# Patient Record
Sex: MALE | Race: WHITE | Employment: UNEMPLOYED | URBAN - METROPOLITAN AREA
[De-identification: names, ages, dates, MRNs, and addresses within clinical notes are randomized per-mention and may not be internally consistent; named-entity substitution may affect disease eponyms.]

---
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
## 2023-05-30 NOTE — ED PROVIDER NOTES
Patient is seen today for an In Person/In Office Visit    I have reviewed Active Medication List, Allergies, Vital Signs and Active Problem List.    Chief Complaint   Patient presents with   • Follow-up     6 month check     HTN    Subjective:    Here for Follow-up of Hypertension    He has underlying hypertension under borderline control although on my recheck blood pressure was a bit better.      He has dyslipidemia and has a history of plaquing to the carotid artery but no significant stenosis.  Not currently on medication for his cholesterol.      He has prediabetes which is been stable.    He voices no other additional concerns at this time.     Current Outpatient Medications   Medication Sig Dispense Refill   • amLODIPine (NORVASC) 10 MG tablet Take 1 tablet by mouth daily. 90 tablet 3   • carvedilol (COREG) 12.5 MG tablet Take 1 tablet by mouth in the morning and 1 tablet in the evening. Take with meals. 180 tablet 3   • atorvastatin (LIPITOR) 10 MG tablet Take 1 tablet by mouth daily. 90 tablet 3   • cetirizine (ZyrTEC) 10 MG tablet Take 10 mg by mouth daily.     • aspirin 81 MG tablet Take 81 mg by mouth daily.       No current facility-administered medications for this visit.           Objective:    Visit Vitals  /80   Pulse 74   Temp 98 °F (36.7 °C) (Temporal)   Resp 16   Wt 103 kg (227 lb)   BMI 27.63 kg/m²       General: Well Developed, Well Nourished. Nontoxic in appearance  HEENT:Conjunctiva are pink, Oropharynx is moist and without exudates  Neck: No carotid bruits are noted bilaterally  Cv: Regular Heart Rate and Rhythm. Normal S1 and S2.    No S3 or S4 heard.   No gallops, rubs or murmurs  Lungs:  Respiratory effort is normal. No rales, rhonchi, or Wheezing is noted.  No accessory muscle use is noted.  Musculoskeletal/extremities:no edema noted to the bilateral lower extremities  Neurologic: Awake, Alert and Oriented to Person, Place, Time and Situation  Skin: Warm, Dry and Intact. No  Primary Care Physician: Earl Leo MD   Attending Physician: No att. providers found     History   Chief Complaint   Patient presents with    Fall     Pt reports falling from tree on Nov 24th. Denies LOC. c/o right wrist pain        HPI   Kenn Augustin is a 25 y.o. male with history of ADHD depression who presents complaining of pain of his right wrist as well as left hip. Patient stated that he was involved in a fall 4 days ago. No LOC and did not hit head. Stated that he did not seek care but the pain on his right wrist has increasingly gotten worse. Otherwise no other complaints.     Past Medical History:   Diagnosis Date    Acute streptococcal pharyngitis 7/28/2021    ADHD (attention deficit hyperactivity disorder)     Depression     Sleep apnea         Past Surgical History:   Procedure Laterality Date    TONSILLECTOMY Bilateral 9/1/2021    [facility] [place]        Family History   Problem Relation Age of Onset    Heart Disease Father     Heart Disease Paternal Grandfather     Cancer Other     Other Other         liver issues        Social History     Socioeconomic History    Marital status: Unknown     Spouse name: None    Number of children: None    Years of education: None    Highest education level: None   Occupational History    Occupation: unemployed   Tobacco Use    Smoking status: Never Smoker    Smokeless tobacco: Never Used   Vaping Use    Vaping Use: Never used   Substance and Sexual Activity    Alcohol use: Never    Drug use: None    Sexual activity: None   Other Topics Concern    None   Social History Narrative    None     Social Determinants of Health     Financial Resource Strain:     Difficulty of Paying Living Expenses: Not on file   Food Insecurity:     Worried About Running Out of Food in the Last Year: Not on file    Irvin of Food in the Last Year: Not on file   Transportation Needs:     Lack of Transportation cyanosis or pallor.  No clubbing.  No rashes.  Psychiatric: Normal Mood and Affect. Thought content normal.     Recent PHQ 2/9 Score    PHQ 2:  Date Adult PHQ 2 Score Adult PHQ 2 Interpretation   5/30/2023 0 No further screening needed       PHQ 9:         Assessment:    Labs:    Lab Services on 05/23/2023   Component Date Value Ref Range Status   • Sodium 05/23/2023 141  135 - 145 mmol/L Final   • Potassium 05/23/2023 4.8  3.4 - 5.1 mmol/L Final   • Chloride 05/23/2023 103  97 - 110 mmol/L Final   • Carbon Dioxide 05/23/2023 29  21 - 32 mmol/L Final   • Anion Gap 05/23/2023 14  7 - 19 mmol/L Final   • Glucose 05/23/2023 104 (H)  70 - 99 mg/dL Final   • BUN 05/23/2023 15  6 - 20 mg/dL Final   • Creatinine 05/23/2023 0.93  0.67 - 1.17 mg/dL Final   • Glomerular Filtration Rate 05/23/2023 >90  >=60 Final    eGFR results = or >60 mL/min/1.73m2 = Normal kidney function. Estimated GFR calculated using the CKD-EPI-R (2021) equation that does not include race in the creatinine calculation.   • BUN/Cr 05/23/2023 16  7 - 25 Final   • Calcium 05/23/2023 8.8  8.4 - 10.2 mg/dL Final   • Bilirubin, Total 05/23/2023 0.5  0.2 - 1.0 mg/dL Final   • GOT/AST 05/23/2023 20  <=37 Units/L Final   • GPT/ALT 05/23/2023 41  <64 Units/L Final   • Alkaline Phosphatase 05/23/2023 69  45 - 117 Units/L Final   • Albumin 05/23/2023 4.0  3.6 - 5.1 g/dL Final   • Protein, Total 05/23/2023 7.8  6.4 - 8.2 g/dL Final   • Globulin 05/23/2023 3.8  2.0 - 4.0 g/dL Final   • A/G Ratio 05/23/2023 1.1  1.0 - 2.4 Final   • Hemoglobin A1C 05/23/2023 6.0 (H)  4.5 - 5.6 % Final      Diabetic Screening  Non Diabetic:             <5.7%  Increased Risk:           5.7-6.4%  Diagnostic For Diabetes:  >6.4%    Diabetic Control  A1C%       eAG mg/dL  6.0            126  6.5            140  7.0            154  7.5            169  8.0            183  8.5            197  9.0            212  9.5            226  10.0           240   • Cholesterol 05/23/2023 241 (H)  <=199  mg/dL Final      Desirable         <200  Borderline High   200 to 239  High              >=240   • Triglycerides 05/23/2023 104  <=149 mg/dL Final      Normal            <150  Borderline High   150 to 199  High              200 to 499  Very High         >=500   • HDL 05/23/2023 56  >=40 mg/dL Final      Low              <40  Borderline Low   40 to 49  Near Optimal     50 to 59  Optimal          >=60   • LDL 05/23/2023 164 (H)  <=129 mg/dL Final      Optimal           <100  Near Optimal      100 to 129  Borderline High   130 to 159  High              160 to 189  Very High         >=190   • Non-HDL Cholesterol 05/23/2023 185  mg/dL Final      Therapeutic Target:  CHD and risk equivalents  <130  Multiple risk factors     <160  0 to 1 risk factor        <190   • Cholesterol/ HDL Ratio 05/23/2023 4.3  <=4.4 Final       Diagnoses pertaining to today's visit/meds/labs:    Hypertension, essential, benign  (primary encounter diagnosis)  Hyperlipidemia, unspecified hyperlipidemia type  Prediabetes  Atherosclerosis of both carotid arteries  Screening PSA (prostate specific antigen)     Orders Placed This Encounter   • CBC No Differential   • Comprehensive Metabolic Panel     Order Specific Question:   Should this test be performed fasting or random?     Answer:   Fasting   • Glycohemoglobin   • Lipid Panel With Reflex     Order Specific Question:   Should this test be performed fasting or random?     Answer:   Fasting   • PSA     Order Specific Question:   How should test results be released to the patient's [employer] portal?     Answer:   Automatic Release   • Thyroid Stimulating Hormone   • amLODIPine (NORVASC) 10 MG tablet     Sig: Take 1 tablet by mouth daily.     Dispense:  90 tablet     Refill:  3   • carvedilol (COREG) 12.5 MG tablet     Sig: Take 1 tablet by mouth in the morning and 1 tablet in the evening. Take with meals.     Dispense:  180 tablet     Refill:  3   • atorvastatin (LIPITOR) 10 MG tablet     Sig: Take 1  He was also tender to palpation on the left hip. I did obtain x-rays of the left hip and right wrist.  Results showed no acute injuries. I do not think that he needed any labs given no signs of systemic infection. I believe that his symptoms at this time is secondary to contusion from the impact. Patient is stable discharged home with recommendation to follow-up with orthopedic surgery symptoms persisted. ClINICAL IMPRESSION:  1. Fall, initial encounter    2. Right wrist pain          PATIENT REFERRED TO:  Cee Marquez MD  [address] [place] [address]  [phone]    Schedule an appointment as soon as possible for a visit   As needed      DISCHARGE MEDICATIONS:  Discharge Medication List as of 11/27/2021  3:24 PM        DISCONTINUED MEDICATIONS:  Discharge Medication List as of 11/27/2021  3:24 PM        DISPOSITION Decision To Discharge 11/27/2021 03:16:02 PM  -We have instructed the patient, Lacy Blair) to return to the ED or call his PCP if his pain/symptoms worsen. -Findings and recommendations explained to patient. He expressed understanding and agreed with the plan. Miguel Angel Zarate MD (electronically signed)  11/28/2021  _________________________________________________________________________________________  _________________________________________________________________________________________  This record is transcribed utilizing voice recognition technology. There are inherent limitations in this technology. In addition, there may be limitations in editing of this report. If there are any discrepancies, please contact me directly.         [address] Katie Dunlap MD  11/28/21 770 tablet by mouth daily.     Dispense:  90 tablet     Refill:  3       __________________________________________      SEE FURTHER DISCUSSION BELOW       Plan:    1. Hypertension-he will monitor closely at home and will re-evaluate on return  2. Dyslipidemia-he is agreeable to adding atorvastatin 10 milligrams daily.  Potential side effects discussed   3. Prediabetes-stable.      He will return in another four months for wellness visit with labs prior

## 2023-09-21 ENCOUNTER — OFFICE VISIT (OUTPATIENT)
Age: 23
End: 2023-09-21

## 2023-09-21 VITALS
TEMPERATURE: 98 F | OXYGEN SATURATION: 95 % | BODY MASS INDEX: 34.37 KG/M2 | HEART RATE: 56 BPM | DIASTOLIC BLOOD PRESSURE: 89 MMHG | HEIGHT: 68 IN | SYSTOLIC BLOOD PRESSURE: 138 MMHG | WEIGHT: 226.8 LBS

## 2023-09-21 DIAGNOSIS — Z48.02 ENCOUNTER FOR REMOVAL OF SUTURES: Primary | ICD-10-CM

## 2024-03-06 ENCOUNTER — OFFICE VISIT (OUTPATIENT)
Age: 24
End: 2024-03-06

## 2024-03-06 VITALS
SYSTOLIC BLOOD PRESSURE: 127 MMHG | OXYGEN SATURATION: 96 % | WEIGHT: 236.2 LBS | HEART RATE: 79 BPM | BODY MASS INDEX: 35.91 KG/M2 | DIASTOLIC BLOOD PRESSURE: 78 MMHG | TEMPERATURE: 98.7 F

## 2024-03-06 DIAGNOSIS — R52 GENERALIZED BODY ACHES: Primary | ICD-10-CM

## 2024-03-06 DIAGNOSIS — J01.90 ACUTE SINUSITIS, RECURRENCE NOT SPECIFIED, UNSPECIFIED LOCATION: ICD-10-CM

## 2024-03-06 LAB
INFLUENZA A ANTIGEN, POC: NEGATIVE
INFLUENZA B ANTIGEN, POC: NEGATIVE
Lab: NORMAL
PERFORMING INSTRUMENT: NORMAL
QC PASS/FAIL: NORMAL
SARS-COV-2, POC: NORMAL

## 2024-03-06 RX ORDER — PREDNISONE 20 MG/1
20 TABLET ORAL 2 TIMES DAILY
Qty: 10 TABLET | Refills: 0 | Status: SHIPPED | OUTPATIENT
Start: 2024-03-06 | End: 2024-03-11

## 2024-03-06 RX ORDER — AMOXICILLIN AND CLAVULANATE POTASSIUM 875; 125 MG/1; MG/1
1 TABLET, FILM COATED ORAL 2 TIMES DAILY
Qty: 14 TABLET | Refills: 0 | Status: SHIPPED | OUTPATIENT
Start: 2024-03-06 | End: 2024-03-13

## 2024-03-06 ASSESSMENT — ENCOUNTER SYMPTOMS
DIARRHEA: 0
COUGH: 1
VOMITING: 0
BACK PAIN: 0
ABDOMINAL PAIN: 0
WHEEZING: 0
SHORTNESS OF BREATH: 0
RHINORRHEA: 0
SINUS PRESSURE: 1
NAUSEA: 1
SORE THROAT: 0

## 2024-03-07 NOTE — PROGRESS NOTES
Duke Miller (:  2000) is a 23 y.o. male,New patient, here for evaluation of the following chief complaint(s):  Generalized Body Aches (Headache, nausea, congestion x 3 days)      ASSESSMENT/PLAN:  1. Generalized body aches    - POCT COVID-19, Antigen  - POCT Influenza A/B Antigen (BD Veritor)    2. Acute sinusitis, recurrence not specified, unspecified location    - amoxicillin-clavulanate (AUGMENTIN) 875-125 MG per tablet; Take 1 tablet by mouth 2 times daily for 7 days  Dispense: 14 tablet; Refill: 0  - predniSONE (DELTASONE) 20 MG tablet; Take 1 tablet by mouth 2 times daily for 5 days  Dispense: 10 tablet; Refill: 0     -increase fluid intake,take Tylenol as needed  No follow-ups on file.    SUBJECTIVE/OBJECTIVE:    History provided by:  Patient  Generalized Body Aches  Severity:  Mild  Onset quality:  Sudden  Duration:  3 days  Timing:  Constant  Progression:  Unchanged  Chronicity:  New  Associated symptoms: congestion, cough, headaches and nausea    Associated symptoms: no abdominal pain, no chest pain, no diarrhea, no ear pain, no fatigue, no fever, no myalgias, no rash, no rhinorrhea, no shortness of breath, no sore throat, no vomiting and no wheezing        Vitals:    24 1910   BP: 127/78   Site: Right Upper Arm   Position: Sitting   Cuff Size: Large Adult   Pulse: 79   Temp: 98.7 °F (37.1 °C)   TempSrc: Oral   SpO2: 96%   Weight: 107.1 kg (236 lb 3.2 oz)       Review of Systems   Constitutional:  Negative for activity change, appetite change, fatigue and fever.   HENT:  Positive for congestion and sinus pressure. Negative for ear pain, rhinorrhea and sore throat.    Respiratory:  Positive for cough. Negative for shortness of breath and wheezing.    Cardiovascular:  Negative for chest pain.   Gastrointestinal:  Positive for nausea. Negative for abdominal pain, diarrhea and vomiting.   Musculoskeletal:  Negative for back pain and myalgias.   Skin:  Negative for rash.   Neurological:  Positive

## 2024-05-13 ENCOUNTER — OFFICE VISIT (OUTPATIENT)
Age: 24
End: 2024-05-13

## 2024-05-13 VITALS
WEIGHT: 229.6 LBS | BODY MASS INDEX: 34.8 KG/M2 | TEMPERATURE: 98.5 F | SYSTOLIC BLOOD PRESSURE: 126 MMHG | OXYGEN SATURATION: 96 % | HEIGHT: 68 IN | RESPIRATION RATE: 18 BRPM | DIASTOLIC BLOOD PRESSURE: 78 MMHG | HEART RATE: 66 BPM

## 2024-05-13 DIAGNOSIS — L03.90 CELLULITIS, UNSPECIFIED CELLULITIS SITE: Primary | ICD-10-CM

## 2024-05-13 RX ORDER — CEPHALEXIN 500 MG/1
500 CAPSULE ORAL 4 TIMES DAILY
Qty: 40 CAPSULE | Refills: 0 | Status: SHIPPED | OUTPATIENT
Start: 2024-05-13 | End: 2024-05-23

## 2024-05-13 ASSESSMENT — ENCOUNTER SYMPTOMS
SORE THROAT: 0
WHEEZING: 0
COUGH: 0
RHINORRHEA: 0
NAUSEA: 0

## 2024-05-13 NOTE — PROGRESS NOTES
Duke Miller (:  2000) is a 23 y.o. male,Established patient, here for evaluation of the following chief complaint(s):  Insect Bite (Pt says he was bit by a spider last night on lft side of hip,possible infection )      ASSESSMENT/PLAN:  1. Cellulitis, unspecified cellulitis site    - cephALEXin (KEFLEX) 500 MG capsule; Take 1 capsule by mouth 4 times daily for 10 days  Dispense: 40 capsule; Refill: 0     -return to  or to the ER if worsening symptoms.  No follow-ups on file.    SUBJECTIVE/OBJECTIVE:    History provided by:  Patient  Insect Bite  Severity:  Mild  Onset quality:  Sudden  Duration:  1 day  Timing:  Constant  Progression:  Worsening  Associated symptoms: rash    Associated symptoms: no abdominal pain, no congestion, no cough, no diarrhea, no ear pain, no fatigue, no fever, no headaches, no myalgias, no nausea, no rhinorrhea, no sore throat, no vomiting and no wheezing        Vitals:    24 1416   BP: 126/78   Pulse: 66   Resp: 18   Temp: 98.5 °F (36.9 °C)   SpO2: 96%   Weight: 104.1 kg (229 lb 9.6 oz)   Height: 1.727 m (5' 8\")       Review of Systems   Constitutional:  Negative for fatigue and fever.   HENT:  Negative for congestion, ear pain, rhinorrhea and sore throat.    Respiratory:  Negative for cough and wheezing.    Gastrointestinal:  Negative for abdominal pain, diarrhea, nausea and vomiting.   Musculoskeletal:  Negative for myalgias.   Skin:  Positive for rash.   Neurological:  Negative for headaches.       Physical Exam  Constitutional:       Appearance: He is obese.   HENT:      Nose: No congestion.      Mouth/Throat:      Mouth: Mucous membranes are moist.      Pharynx: No posterior oropharyngeal erythema.   Eyes:      Conjunctiva/sclera: Conjunctivae normal.      Pupils: Pupils are equal, round, and reactive to light.   Cardiovascular:      Rate and Rhythm: Normal rate and regular rhythm.   Pulmonary:      Effort: Pulmonary effort is normal.      Breath sounds: Normal breath

## 2024-06-20 ENCOUNTER — OFFICE VISIT (OUTPATIENT)
Age: 24
End: 2024-06-20

## 2024-06-20 VITALS
RESPIRATION RATE: 16 BRPM | DIASTOLIC BLOOD PRESSURE: 75 MMHG | HEART RATE: 84 BPM | TEMPERATURE: 98.2 F | WEIGHT: 224 LBS | OXYGEN SATURATION: 96 % | HEIGHT: 66 IN | SYSTOLIC BLOOD PRESSURE: 127 MMHG | BODY MASS INDEX: 36 KG/M2

## 2024-06-20 DIAGNOSIS — L23.9 ALLERGIC DERMATITIS: Primary | ICD-10-CM

## 2024-06-20 RX ORDER — TRIAMCINOLONE ACETONIDE 0.25 MG/G
CREAM TOPICAL
Qty: 15 G | Refills: 0 | Status: SHIPPED | OUTPATIENT
Start: 2024-06-20

## 2024-06-20 RX ORDER — METHYLPREDNISOLONE 4 MG/1
TABLET ORAL
Qty: 1 KIT | Refills: 0 | Status: SHIPPED | OUTPATIENT
Start: 2024-06-20 | End: 2024-06-26

## 2024-06-20 RX ORDER — LAMOTRIGINE 25 MG/1
TABLET ORAL
COMMUNITY
Start: 2024-06-03

## 2024-06-20 NOTE — PATIENT INSTRUCTIONS
Discharge medications reviewed with the patient and appropriate educational materials and side effects teaching were provided.    seek immediate medical care if:    You have symptoms of infection, such as:  Increased pain, swelling, warmth, or redness.  Red streaks leading from the area.  Pus draining from the area.  A fever.     You have joint pain along with the rash.

## 2024-06-20 NOTE — PROGRESS NOTES
Duke Miller (:  2000) is a 23 y.o. male,Established patient, here for evaluation of the following chief complaint(s):  Rash (Rash on arms, hurts and burns x 2 days )      Assessment & Plan :  Visit Diagnoses and Associated Orders       Allergic dermatitis    -  Primary    methylPREDNISolone (MEDROL DOSEPACK) 4 MG tablet [4991]      triamcinolone (KENALOG) 0.025 % cream [8112]           ORDERS WITHOUT AN ASSOCIATED DIAGNOSIS    lamoTRIgine (LAMICTAL) 25 MG tablet [40475]             seek immediate medical care if:    You have symptoms of infection, such as:  Increased pain, swelling, warmth, or redness.  Red streaks leading from the area.  Pus draining from the area.  A fever.     You have joint pain along with the rash.          Subjective :  Rash (Rash on arms, hurts and burns x 2 days )  Pt states started new medication 2 days ago and rash appeared.  This provider recommended calling Rx Dr before d/c medication d/t medication being psych med.      History provided by:  Patient      23 y.o. male presents with symptoms of BUE burning/occasionally itching rash         Vitals:    24 1230   BP: 127/75   Site: Right Upper Arm   Position: Sitting   Cuff Size: Large Adult   Pulse: 84   Resp: 16   Temp: 98.2 °F (36.8 °C)   TempSrc: Oral   SpO2: 96%   Weight: 101.6 kg (224 lb)   Height: 1.676 m (5' 6\")       No results found for this visit on 24.      Objective   Physical Exam  Constitutional:       General: He is not in acute distress.     Appearance: He is well-developed.   HENT:      Right Ear: External ear normal.      Left Ear: External ear normal.      Nose: Nose normal.      Mouth/Throat:      Lips: Pink.   Eyes:      General: Lids are normal.   Cardiovascular:      Rate and Rhythm: Normal rate.   Pulmonary:      Effort: Pulmonary effort is normal.   Skin:     General: Skin is warm and dry.      Findings: Erythema and rash present.   Psychiatric:         Behavior: Behavior is cooperative.

## 2025-09-04 ENCOUNTER — OFFICE VISIT (OUTPATIENT)
Age: 25
End: 2025-09-04

## 2025-09-04 VITALS
SYSTOLIC BLOOD PRESSURE: 118 MMHG | TEMPERATURE: 98.1 F | DIASTOLIC BLOOD PRESSURE: 76 MMHG | WEIGHT: 202 LBS | BODY MASS INDEX: 30.62 KG/M2 | HEIGHT: 68 IN

## 2025-09-04 DIAGNOSIS — J02.9 SORE THROAT: ICD-10-CM

## 2025-09-04 DIAGNOSIS — J02.9 ACUTE PHARYNGITIS, UNSPECIFIED ETIOLOGY: Primary | ICD-10-CM

## 2025-09-04 RX ORDER — AMOXICILLIN 500 MG/1
500 CAPSULE ORAL 2 TIMES DAILY
Qty: 20 CAPSULE | Refills: 0 | Status: SHIPPED | OUTPATIENT
Start: 2025-09-04 | End: 2025-09-14

## 2025-09-04 ASSESSMENT — ENCOUNTER SYMPTOMS
SORE THROAT: 1
DIARRHEA: 0
SHORTNESS OF BREATH: 0
COUGH: 0
SINUS PRESSURE: 0
VOMITING: 0
TROUBLE SWALLOWING: 0

## (undated) DEVICE — BLADE ES ELASTOMERIC COAT INSUL DURABLE BEND UPTO 90DEG

## (undated) DEVICE — SOLUTION IRRIG 500ML 0.9% SOD CHL USP POUR PLAS BTL

## (undated) DEVICE — YANKAUER,BULB TIP,W/O VENT,RIGID,STERILE: Brand: MEDLINE

## (undated) DEVICE — GLOVE SURG SZ 85 L12IN FNGR ORTHO 126MIL CRM LTX FREE

## (undated) DEVICE — MEDICINE CUP, GRADUATED, STER: Brand: MEDLINE

## (undated) DEVICE — GLOVE ORANGE PI 8 1/2   MSG9085

## (undated) DEVICE — SPONGE TNSL 7/8IN MED TPE STRNG RADOPQ

## (undated) DEVICE — TUBING, SUCTION, 1/4" X 10', STRAIGHT: Brand: MEDLINE

## (undated) DEVICE — CLEANER ES TIP W2XL2IN ADH BK RADPQ FOR S STL ELECTRD

## (undated) DEVICE — MASC TURNOVER KIT: Brand: MEDLINE INDUSTRIES, INC.

## (undated) DEVICE — SUTURE VCRL SZ 2-0 L27IN ABSRB UD L26MM SH 1/2 CIR J417H

## (undated) DEVICE — PACK PROCEDURE SURG EXTREMITY MFFOP CUST

## (undated) DEVICE — YANKAUER SUCTION INSTRUMENT NO CONTROL VENT, BULB TIP, CLEAR: Brand: YANKAUER

## (undated) DEVICE — COAGULATOR SUCT 10FR L6IN HND FT SWCH VALLEYLAB